# Patient Record
Sex: MALE | Race: WHITE | URBAN - NONMETROPOLITAN AREA
[De-identification: names, ages, dates, MRNs, and addresses within clinical notes are randomized per-mention and may not be internally consistent; named-entity substitution may affect disease eponyms.]

---

## 2018-03-13 ENCOUNTER — OFFICE VISIT (OUTPATIENT)
Dept: FAMILY MEDICINE CLINIC | Age: 33
End: 2018-03-13

## 2018-03-13 VITALS
BODY MASS INDEX: 30.75 KG/M2 | WEIGHT: 232 LBS | SYSTOLIC BLOOD PRESSURE: 160 MMHG | DIASTOLIC BLOOD PRESSURE: 74 MMHG | OXYGEN SATURATION: 99 % | HEART RATE: 84 BPM | HEIGHT: 73 IN

## 2018-03-13 DIAGNOSIS — I10 ESSENTIAL HYPERTENSION: Primary | ICD-10-CM

## 2018-03-13 PROCEDURE — G8484 FLU IMMUNIZE NO ADMIN: HCPCS | Performed by: NURSE PRACTITIONER

## 2018-03-13 PROCEDURE — 99213 OFFICE O/P EST LOW 20 MIN: CPT | Performed by: NURSE PRACTITIONER

## 2018-03-13 PROCEDURE — G8427 DOCREV CUR MEDS BY ELIG CLIN: HCPCS | Performed by: NURSE PRACTITIONER

## 2018-03-13 PROCEDURE — 1036F TOBACCO NON-USER: CPT | Performed by: NURSE PRACTITIONER

## 2018-03-13 PROCEDURE — G8417 CALC BMI ABV UP PARAM F/U: HCPCS | Performed by: NURSE PRACTITIONER

## 2018-03-13 RX ORDER — NAPROXEN 500 MG/1
500 TABLET ORAL 2 TIMES DAILY WITH MEALS
Qty: 60 TABLET | Refills: 2 | Status: SHIPPED | OUTPATIENT
Start: 2018-03-13

## 2018-03-13 RX ORDER — LISINOPRIL AND HYDROCHLOROTHIAZIDE 12.5; 1 MG/1; MG/1
1 TABLET ORAL DAILY
Qty: 30 TABLET | Refills: 5 | Status: SHIPPED | OUTPATIENT
Start: 2018-03-13

## 2018-03-13 ASSESSMENT — ENCOUNTER SYMPTOMS
EYES NEGATIVE: 1
RESPIRATORY NEGATIVE: 1
GASTROINTESTINAL NEGATIVE: 1

## 2018-03-13 ASSESSMENT — PATIENT HEALTH QUESTIONNAIRE - PHQ9
SUM OF ALL RESPONSES TO PHQ QUESTIONS 1-9: 0
2. FEELING DOWN, DEPRESSED OR HOPELESS: 0
SUM OF ALL RESPONSES TO PHQ9 QUESTIONS 1 & 2: 0
1. LITTLE INTEREST OR PLEASURE IN DOING THINGS: 0

## 2018-03-13 NOTE — PROGRESS NOTES
Subjective:      Patient ID: Martine Lawrence is a 29 y.o. male. HPI    Chief Complaint   Patient presents with    Other     htn      Hypertension: Home blood pressure monitoring: No however patient states he had high blood pressure last week at the [facility]. He is not adherent to a low sodium diet. Patient complains of lightheadedness. Antihypertensive medication side effects: NA as patient is not currently taking any medications. Use of agents associated with hypertension: NSAIDS. Sodium (mmol/L)   Date Value   06/18/2015 138    BUN (mg/dL)   Date Value   06/18/2015 10    Glucose (mg/dL)   Date Value   06/18/2015 87      Potassium (mmol/L)   Date Value   06/18/2015 4.0    CREATININE (mg/dL)   Date Value   06/18/2015 0.8 (L)         Past Medical History:   Diagnosis Date    Hypertension      Past Surgical History:   Procedure Laterality Date    ANTERIOR CRUCIATE LIGAMENT REPAIR       Family History   Problem Relation Age of Onset    Other Mother      ALS    No Known Problems Brother     High Blood Pressure Maternal Grandmother     Cancer Maternal Grandfather     Diabetes Paternal Grandmother     High Blood Pressure Paternal Grandmother     Diabetes Paternal Grandfather     High Blood Pressure Paternal Grandfather     Heart Disease Paternal Grandfather        Review of Systems   Constitutional: Negative for appetite change, chills and fever. HENT: Negative. Eyes: Negative. Respiratory: Negative. Cardiovascular: Negative. Gastrointestinal: Negative. Endocrine: Negative. Genitourinary: Negative. Musculoskeletal: Negative. Skin: Negative. Neurological: Positive for light-headedness. Psychiatric/Behavioral: Negative.         Patient Active Problem List   Diagnosis    Hypertension       Outpatient Prescriptions Marked as Taking for the 3/13/18 encounter (Office Visit) with Brayden Pablo CNP   Medication Sig Dispense Refill    previously tolerated lisinopril/hydrochlorothiazide and I recommended he start back on it. Also recommended labs however patient states he has extremely high deductible and prefers to attend the upcoming health fair at [facility]. Provided patient with details regarding health fair and advised to pre-register for the best pricing. Patient can get CMP, TSH, A1c and lipids for $30. Also advised patient to monitor blood pressure 2-3 times a week, keep a log and call with blood pressure report at 2 and 4 weeks out. Patient verbalized understanding and agreeable to plan. To see patient instructions. - lisinopril-hydrochlorothiazide (PRINZIDE) 10-12.5 MG per tablet; Take 1 tablet by mouth daily  Dispense: 30 tablet;  Refill: 5

## 2018-03-13 NOTE — PATIENT INSTRUCTIONS
to limit how much sodium you eat to less than 2,300 milligrams (mg) a day. If you limit your sodium to 1,500 mg a day, you can lower your blood pressure even more. ¨ Buy foods that are labeled \"unsalted,\" \"sodium-free,\" or \"low-sodium. \" Foods labeled \"reduced-sodium\" and \"light sodium\" may still have too much sodium. ¨ Flavor your food with garlic, lemon juice, onion, vinegar, herbs, and spices instead of salt. Do not use soy sauce, steak sauce, onion salt, garlic salt, mustard, or ketchup on your food. ¨ Use less salt (or none) when recipes call for it. You can often use half the salt a recipe calls for without losing flavor. · Be physically active. Get at least 30 minutes of exercise on most days of the week. Walking is a good choice. You also may want to do other activities, such as running, swimming, cycling, or playing tennis or team sports. · Limit alcohol to 2 drinks a day for men and 1 drink a day for women. · Eat plenty of fruits, vegetables, and low-fat dairy products. Eat less saturated and total fats. How is high blood pressure treated? · Your doctor will suggest making lifestyle changes. For example, your doctor may ask you to eat healthy foods, quit smoking, lose extra weight, and be more active. · If lifestyle changes don't help enough or your blood pressure is very high, you will have to take medicine every day. Follow-up care is a key part of your treatment and safety. Be sure to make and go to all appointments, and call your doctor if you are having problems. It's also a good idea to know your test results and keep a list of the medicines you take. Where can you learn more? Go to https://ModenusmatthewMy Artful Jewels.SpongeFish. org and sign in to your [employer] account. Enter P501 in the Single Digits box to learn more about \"Learning About High Blood Pressure. \"     If you do not have an account, please click on the \"Sign Up Now\" link.   Current as of: September 21, 2016  Content Version: squash, spinach, broccoli, carrots, cauliflower, and onions. ¨ Have a variety of cut-up vegetables with a low-fat dip as an appetizer instead of chips and dip. ¨ Sprinkle sunflower seeds or chopped almonds over salads. Or try adding chopped walnuts or almonds to cooked vegetables. ¨ Try some vegetarian meals using beans and peas. Add garbanzo or kidney beans to salads. Make burritos and tacos with mashed lewis beans or black beans. Where can you learn more? Go to https://Mocha.cnpepiceweb.Altruik. org and sign in to your [employer]ure account. Enter R742 in the Medicalodges box to learn more about \"DASH Diet: Care Instructions. \"     If you do not have an account, please click on the \"Sign Up Now\" link. Current as of: September 21, 2016  Content Version: 11.5  © 1710-5070 [employer]. Care instructions adapted under license by [facility] [place] ([facility]). If you have questions about a medical condition or this instruction, always ask your healthcare professional. [place] [address] any warranty or liability for your use of this information. Patient Education          hydrochlorothiazide and lisinopril  Pronunciation:  JERAD smith and lye SIN oh pril  Brand:  Zestoretic  What is the most important information I should know about hydrochlorothiazide and lisinopril? Do not use if you are pregnant. If you become pregnant, stop taking this medicine and tell your doctor right away. You should not use this medicine if you have hereditary angioedema, if you are unable to urinate, if you are allergic to sulfa drugs, or if you have ever had a severe allergic reaction to any ACE inhibitor. If you have diabetes, do not use hydrochlorothiazide and lisinopril together with any medication that contains aliskiren (Amturnide, Tekturna, Tekamlo). What is hydrochlorothiazide and lisinopril?   Hydrochlorothiazide is a thiazide diuretic (water pill) that helps prevent your body from absorbing too much salt, which can cause fluid retention. Lisinopril is in an ACE inhibitor. ACE stands for angiotensin converting enzyme. Lisinopril lowers blood pressure and also relieves symptoms of fluid retention. Hydrochlorothiazide and lisinopril is a combination medicine used to treat hypertension (high blood pressure). Hydrochlorothiazide and lisinopril may also be used for purposes not listed in this medication guide. What should I discuss with my healthcare provider before taking hydrochlorothiazide and lisinopril? You should not use this medicine if you are allergic to hydrochlorothiazide or lisinopril, or if:  · you have hereditary angioedema;  · you are unable to urinate;  · you have an allergy to sulfa drugs; or  · you have ever had a severe allergic reaction to any ACE inhibitor (benazepril, captopril, enalapril, fosinopril, moexipril, perindopril, quinapril, ramipril, trandolapril). If you have diabetes, do not use hydrochlorothiazide and lisinopril together with any medication that contains aliskiren (Amturnide, Tekturna, Tekamlo). You may also need to avoid taking hydrochlorothiazide and lisinopril with aliskiren if you have kidney disease. To make sure hydrochlorothiazide and lisinopril is safe for you, tell your doctor if you have:  · kidney disease (or if you are on dialysis);  · cirrhosis or other liver disease;  · glaucoma;  · heart disease or congestive heart failure;  · asthma or allergies;  · gout;  · lupus;  · diabetes; or  · an allergy to sulfa drugs or penicillin. Do not use if you are pregnant. If you become pregnant, stop taking this medicine and tell your doctor right away. Lisinopril can cause injury or death to the unborn baby if you take the medicine during your second or third trimester. This medicine can pass into breast milk and may harm a nursing baby. Tell your doctor if you are breast-feeding a baby. How should I take hydrochlorothiazide and lisinopril?   Follow all directions on your prescription label. Your doctor may occasionally change your dose. Do not take this medicine in larger or smaller amounts or for longer than recommended. Take each dose with a full glass of water. Call your doctor if you have ongoing vomiting or diarrhea, or if you are sweating more than usual. You can easily become dehydrated while taking this medicine. This can lead to very low blood pressure, electrolyte disorders, or kidney failure. While using hydrochlorothiazide and lisinopril, you may need frequent blood tests at your doctor's office. Your blood pressure will need to be checked often. Keep using this medicine as directed, even if you feel well. High blood pressure often has no symptoms. You may need to use blood pressure medication for the rest of your life. If you need surgery or medical tests, tell the doctor ahead of time that you are taking medicine that contains hydrochlorothiazide. You may need to stop using the medicine for a short time. Store at room temperature away from moisture, heat, and light. What happens if I miss a dose? Take the missed dose as soon as you remember. Skip the missed dose if it is almost time for your next scheduled dose. Do not take extra medicine to make up the missed dose. What happens if I overdose? Seek emergency medical attention or call the Poison Help line at 1-155.863.6351. What should I avoid while taking hydrochlorothiazide and lisinopril? Drinking alcohol can further lower your blood pressure and may increase certain side effects of this medicine. Do not use potassium supplements or salt substitutes while you are taking this medicine, unless your doctor has told you to. Avoid getting up too fast from a sitting or lying position, or you may feel dizzy. Get up slowly and steady yourself to prevent a fall. Avoid becoming overheated or dehydrated during exercise, in hot weather, or by not drinking enough fluids.  Follow your doctor's others, and use this medication only for the indication prescribed. Every effort has been made to ensure that the information provided by [address] is accurate, up-to-date, and complete, but no guarantee is made to that effect. Drug information contained herein may be time sensitive. [facility] information has been compiled for use by healthcare practitioners and consumers in the [place] and therefore [facility] does not warrant that uses outside of the [place] are appropriate, unless specifically indicated otherwise. [facility]'s drug information does not endorse drugs, diagnose patients or recommend therapy. [facility]'s drug information is an informational resource designed to assist licensed healthcare practitioners in caring for their patients and/or to serve consumers viewing this service as a supplement to, and not a substitute for, the expertise, skill, knowledge and judgment of healthcare practitioners. The absence of a warning for a given drug or drug combination in no way should be construed to indicate that the drug or drug combination is safe, effective or appropriate for any given patient. [facility] does not assume any responsibility for any aspect of healthcare administered with the aid of information [facility] provides. The information contained herein is not intended to cover all possible uses, directions, precautions, warnings, drug interactions, allergic reactions, or adverse effects. If you have questions about the drugs you are taking, check with your doctor, nurse or pharmacist.  Copyright 0549-7940 [address]: 14.01. Revision date: 3/8/2017. Care instructions adapted under license by [facility] ([facility]). If you have questions about a medical condition or this instruction, always ask your healthcare professional. [place] [address] any warranty or liability for your use of this information.